# Patient Record
Sex: MALE | Race: BLACK OR AFRICAN AMERICAN | Employment: FULL TIME | URBAN - METROPOLITAN AREA
[De-identification: names, ages, dates, MRNs, and addresses within clinical notes are randomized per-mention and may not be internally consistent; named-entity substitution may affect disease eponyms.]

---

## 2021-03-17 ENCOUNTER — OFFICE VISIT (OUTPATIENT)
Dept: FAMILY MEDICINE CLINIC | Age: 36
End: 2021-03-17
Payer: COMMERCIAL

## 2021-03-17 VITALS
WEIGHT: 145.2 LBS | HEIGHT: 67 IN | DIASTOLIC BLOOD PRESSURE: 80 MMHG | OXYGEN SATURATION: 98 % | BODY MASS INDEX: 22.79 KG/M2 | HEART RATE: 85 BPM | SYSTOLIC BLOOD PRESSURE: 132 MMHG | TEMPERATURE: 98.2 F

## 2021-03-17 DIAGNOSIS — F41.9 ANXIETY: Primary | ICD-10-CM

## 2021-03-17 DIAGNOSIS — Z13.1 SCREENING FOR DIABETES MELLITUS: ICD-10-CM

## 2021-03-17 DIAGNOSIS — J30.2 SEASONAL ALLERGIES: ICD-10-CM

## 2021-03-17 DIAGNOSIS — F51.01 PRIMARY INSOMNIA: ICD-10-CM

## 2021-03-17 DIAGNOSIS — K21.9 GASTROESOPHAGEAL REFLUX DISEASE WITHOUT ESOPHAGITIS: ICD-10-CM

## 2021-03-17 DIAGNOSIS — Z13.220 SCREENING FOR LIPID DISORDERS: ICD-10-CM

## 2021-03-17 DIAGNOSIS — G89.29 CHRONIC BILATERAL LOW BACK PAIN WITHOUT SCIATICA: ICD-10-CM

## 2021-03-17 DIAGNOSIS — M54.50 CHRONIC BILATERAL LOW BACK PAIN WITHOUT SCIATICA: ICD-10-CM

## 2021-03-17 DIAGNOSIS — D22.9 ATYPICAL NEVI: ICD-10-CM

## 2021-03-17 DIAGNOSIS — H35.713 CENTRAL SEROUS CHORIORETINOPATHY OF BOTH EYES: ICD-10-CM

## 2021-03-17 DIAGNOSIS — M21.921: ICD-10-CM

## 2021-03-17 LAB
A/G RATIO: 1.5 (ref 1.1–2.2)
ALBUMIN SERPL-MCNC: 4.9 G/DL (ref 3.4–5)
ALP BLD-CCNC: 59 U/L (ref 40–129)
ALT SERPL-CCNC: 16 U/L (ref 10–40)
ANION GAP SERPL CALCULATED.3IONS-SCNC: 14 MMOL/L (ref 3–16)
AST SERPL-CCNC: 19 U/L (ref 15–37)
BILIRUB SERPL-MCNC: 1.5 MG/DL (ref 0–1)
BUN BLDV-MCNC: 13 MG/DL (ref 7–20)
CALCIUM SERPL-MCNC: 9.7 MG/DL (ref 8.3–10.6)
CHLORIDE BLD-SCNC: 106 MMOL/L (ref 99–110)
CHOLESTEROL, TOTAL: 205 MG/DL (ref 0–199)
CO2: 26 MMOL/L (ref 21–32)
CREAT SERPL-MCNC: 0.9 MG/DL (ref 0.9–1.3)
GFR AFRICAN AMERICAN: >60
GFR NON-AFRICAN AMERICAN: >60
GLOBULIN: 3.2 G/DL
GLUCOSE BLD-MCNC: 96 MG/DL (ref 70–99)
HDLC SERPL-MCNC: 64 MG/DL (ref 40–60)
LDL CHOLESTEROL CALCULATED: 109 MG/DL
POTASSIUM SERPL-SCNC: 4.5 MMOL/L (ref 3.5–5.1)
SODIUM BLD-SCNC: 146 MMOL/L (ref 136–145)
TOTAL PROTEIN: 8.1 G/DL (ref 6.4–8.2)
TRIGL SERPL-MCNC: 162 MG/DL (ref 0–150)
VLDLC SERPL CALC-MCNC: 32 MG/DL

## 2021-03-17 PROCEDURE — 99204 OFFICE O/P NEW MOD 45 MIN: CPT | Performed by: NURSE PRACTITIONER

## 2021-03-17 ASSESSMENT — ENCOUNTER SYMPTOMS
BACK PAIN: 0
COUGH: 0
NAUSEA: 0
SHORTNESS OF BREATH: 0
BLOOD IN STOOL: 0
ABDOMINAL PAIN: 0
CONSTIPATION: 0
VOMITING: 0

## 2021-03-17 ASSESSMENT — ANXIETY QUESTIONNAIRES
3. WORRYING TOO MUCH ABOUT DIFFERENT THINGS: 3-NEARLY EVERY DAY
7. FEELING AFRAID AS IF SOMETHING AWFUL MIGHT HAPPEN: 2-OVER HALF THE DAYS
2. NOT BEING ABLE TO STOP OR CONTROL WORRYING: 2-OVER HALF THE DAYS
1. FEELING NERVOUS, ANXIOUS, OR ON EDGE: 3-NEARLY EVERY DAY

## 2021-03-17 ASSESSMENT — PATIENT HEALTH QUESTIONNAIRE - PHQ9
10. IF YOU CHECKED OFF ANY PROBLEMS, HOW DIFFICULT HAVE THESE PROBLEMS MADE IT FOR YOU TO DO YOUR WORK, TAKE CARE OF THINGS AT HOME, OR GET ALONG WITH OTHER PEOPLE: 1
9. THOUGHTS THAT YOU WOULD BE BETTER OFF DEAD, OR OF HURTING YOURSELF: 0
4. FEELING TIRED OR HAVING LITTLE ENERGY: 2
7. TROUBLE CONCENTRATING ON THINGS, SUCH AS READING THE NEWSPAPER OR WATCHING TELEVISION: 0
SUM OF ALL RESPONSES TO PHQ QUESTIONS 1-9: 8
SUM OF ALL RESPONSES TO PHQ QUESTIONS 1-9: 8
8. MOVING OR SPEAKING SO SLOWLY THAT OTHER PEOPLE COULD HAVE NOTICED. OR THE OPPOSITE, BEING SO FIGETY OR RESTLESS THAT YOU HAVE BEEN MOVING AROUND A LOT MORE THAN USUAL: 0
3. TROUBLE FALLING OR STAYING ASLEEP: 2

## 2021-03-17 NOTE — PROGRESS NOTES
3/17/2021    Clary Forde (:  1985) is a 38 y.o. male, here for evaluation of the following medical concerns:         Establish Care       Concern for HTN- was elevated when he got his wisdom teeth removed. Concern for DM2- no reason. Eye Problem (. 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 Is following with ophthalmology. Gastroesophageal Reflux- was on Prilosec, quit taking it and is not bothering him. Anxiety (never been on meds, wants to see a therapist)   Stressed about his health. Insomnia (no able to sleep at night ) goes to sleep, but wakes up and cannot get back to sleep. Is wide awake, but denies being anxious or racing thoughts. Not sure what he wants to do about his anxiety and insomnia. Thinks its situational -  Is able to control anxiety, is immediately panicky, but then can calm himself down. Denies SI      Would like to try behavioral therapy     Back Pain (couple years  ago had sciatica) was working at [employer] and was lifting something and immediately had sciatica. Went to urgent care. None since. Back stiffness when waking up in the AM, resolved with movement. Bony structure (right elbow, no pain, sometimes there is discomfort. ) started about one month ago. Has never had this before. No limited ROM numbness tingling injury. Mole (inner left leg thigh)- has been there about five years. Not rapidly growing. Itches sometimes. Does not open up or bleed. Has always been black in color. Is rough. No skin cancer in [facility]. Not a UV ray area on his body. Other (talk about COVID- Vaccines)                  Review of Systems   Constitutional: Negative for activity change, appetite change, chills, diaphoresis, fatigue, fever and unexpected weight change. Eyes: Negative for visual disturbance. Respiratory: Negative for cough and shortness of breath.     Cardiovascular: Negative for chest pain, palpitations and leg swelling. Gastrointestinal: Negative for abdominal pain, blood in stool, constipation, nausea and vomiting. Genitourinary: Negative for difficulty urinating. Musculoskeletal: Negative for arthralgias, back pain and gait problem. Skin: Negative. Neurological: Negative for dizziness, weakness, light-headedness, numbness and headaches. Psychiatric/Behavioral: Positive for sleep disturbance. Negative for dysphoric mood and suicidal ideas. The patient is nervous/anxious. Prior to Visit Medications    Not on File        No Known Allergies    History reviewed. No pertinent past medical history. Past Surgical History:   Procedure Laterality Date    TOOTH EXTRACTION         Social History     Socioeconomic History    Marital status: Single     Spouse name: Not on file    Number of children: Not on file    Years of education: Not on file    Highest education level: Not on file   Occupational History    Not on file   Social Needs    Financial resource strain: Not on file    Food insecurity     Worry: Not on file     Inability: Not on file    Transportation needs     Medical: Not on file     Non-medical: Not on file   Tobacco Use    Smoking status: Never Smoker    Smokeless tobacco: Never Used   Substance and Sexual Activity    Alcohol use: Yes     Comment: 3-4 times per week- 1-2 glasses of wine.     Drug use: Never    Sexual activity: Yes   Lifestyle    Physical activity     Days per week: Not on file     Minutes per session: Not on file    Stress: Not on file   Relationships    Social connections     Talks on phone: Not on file     Gets together: Not on file     Attends [employer] service: Not on file     Active member of club or organization: Not on file     Attends meetings of clubs or organizations: Not on file     Relationship status: Not on file    Intimate partner violence     Fear of current or ex partner: Not on file     Emotionally abused: Not on file     Physically abused: Not on file     Forced sexual activity: Not on file   Other Topics Concern    Not on file   Social History Narrative    Not on file        History reviewed. No pertinent family history. Vitals:    03/17/21 0830   BP: 132/80   Site: Left Upper Arm   Position: Sitting   Cuff Size: Medium Adult   Pulse: 85   Temp: 98.2 °F (36.8 °C)   SpO2: 98%   Weight: 145 lb 3.2 oz (65.9 kg)   Height: 5' 7\" (1.702 m)     Estimated body mass index is 22.74 kg/m² as calculated from the following:    Height as of this encounter: 5' 7\" (1.702 m). Weight as of this encounter: 145 lb 3.2 oz (65.9 kg). Physical Exam  Vitals signs reviewed. Constitutional:       General: He is not in acute distress. Appearance: Normal appearance. He is normal weight. He is not ill-appearing, toxic-appearing or diaphoretic. HENT:      Head: Normocephalic and atraumatic. Nose: Nose normal.   Eyes:      Extraocular Movements: Extraocular movements intact. Pupils: Pupils are equal, round, and reactive to light. Neck:      Musculoskeletal: Normal range of motion and neck supple. Cardiovascular:      Rate and Rhythm: Normal rate and regular rhythm. Heart sounds: Normal heart sounds. Pulmonary:      Effort: Pulmonary effort is normal. No respiratory distress. Breath sounds: Normal breath sounds. Abdominal:      General: Bowel sounds are normal. There is no distension. Palpations: Abdomen is soft. There is no mass. Tenderness: There is no abdominal tenderness. Hernia: No hernia is present. Musculoskeletal: Normal range of motion. Comments: Right elbow hard, non tender bony protrusion. No crepitus or edema. Normal ROM. Not reducible    Skin:     General: Skin is warm and dry. Comments: Black rough irregular bordered lesion left medial thigh   Neurological:      General: No focal deficit present. Mental Status: He is alert and oriented to person, place, and time. Mental status is at baseline. Motor: No weakness. Gait: Gait normal.   Psychiatric:         Mood and Affect: Mood normal.         Behavior: Behavior normal.         Thought Content: Thought content normal.         Judgment: Judgment normal.         ASSESSMENT/PLAN:  1. Anxiety  Call provider with in network psych referral name      2. Primary insomnia  Melatonin PRN OTC. No naps. No caffeine after 3pm  Discussed sleep hygeine    3. Gastroesophageal reflux disease without esophagitis  Discussed long term PPI use risk and changing diet     4. Seasonal allergies  OTC zyrtec or flonase     5. Chronic bilateral low back pain without sciatica  Exercise. Core strengthening. Chiropractics     6. Atypical nevi  - [facility] - Timothy Benz DO, Dermatology, [place]    7. Deformity, elbow acquired, right  Follow up if wanting XRAY. Not currently bothering him. 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 Screening for lipid disorders  - Lipid Panel; Future    10. Screening for diabetes mellitus  - Comprehensive Metabolic Panel; Future    Fasting labs per request   Had HIV and hepatitis screenings in 2018- negative. Has not been sexually active since being tested. Refuses flu vaccine       Present to the ER for any emergent or acute symptoms not managed at home or in office. .  Return in about 1 year (around 3/17/2022) for anual exam.    An  electronic signature was used to authenticate this note.     --RAYNE Gupta - NP on 3/17/2021 at 12:47 PM

## 2021-03-19 ENCOUNTER — TELEPHONE (OUTPATIENT)
Dept: FAMILY MEDICINE CLINIC | Age: 36
End: 2021-03-19

## 2022-01-06 RX ORDER — CETIRIZINE HYDROCHLORIDE 10 MG/1
10 TABLET ORAL DAILY
Qty: 90 TABLET | Refills: 0 | Status: SHIPPED | OUTPATIENT
Start: 2022-01-06 | End: 2022-03-16 | Stop reason: SDUPTHER

## 2022-01-06 NOTE — TELEPHONE ENCOUNTER
Patient is requesting a refill for Zyrtec 10mg. States that he was getting this prescribed. Please advise.

## 2022-01-06 NOTE — TELEPHONE ENCOUNTER
Patient has never been prescribed zyrtec for his allergies, but his last note march 2021 does mention OTC zyrtec use. Will fill 90 day supply.  Insurance may not cover due to being OTC med

## 2022-03-16 ENCOUNTER — OFFICE VISIT (OUTPATIENT)
Dept: FAMILY MEDICINE CLINIC | Age: 37
End: 2022-03-16
Payer: COMMERCIAL

## 2022-03-16 VITALS
DIASTOLIC BLOOD PRESSURE: 84 MMHG | HEART RATE: 87 BPM | SYSTOLIC BLOOD PRESSURE: 136 MMHG | WEIGHT: 154.6 LBS | BODY MASS INDEX: 24.27 KG/M2 | HEIGHT: 67 IN | OXYGEN SATURATION: 99 %

## 2022-03-16 DIAGNOSIS — K21.9 GASTROESOPHAGEAL REFLUX DISEASE WITHOUT ESOPHAGITIS: ICD-10-CM

## 2022-03-16 DIAGNOSIS — F41.9 ANXIETY: ICD-10-CM

## 2022-03-16 DIAGNOSIS — J30.2 SEASONAL ALLERGIES: ICD-10-CM

## 2022-03-16 DIAGNOSIS — Z00.00 ANNUAL PHYSICAL EXAM: Primary | ICD-10-CM

## 2022-03-16 PROCEDURE — 99395 PREV VISIT EST AGE 18-39: CPT | Performed by: NURSE PRACTITIONER

## 2022-03-16 RX ORDER — CETIRIZINE HYDROCHLORIDE 10 MG/1
10 TABLET ORAL DAILY
Qty: 90 TABLET | Refills: 3 | Status: SHIPPED | OUTPATIENT
Start: 2022-03-16

## 2022-03-16 ASSESSMENT — PATIENT HEALTH QUESTIONNAIRE - PHQ9
2. FEELING DOWN, DEPRESSED OR HOPELESS: 0
SUM OF ALL RESPONSES TO PHQ QUESTIONS 1-9: 0
1. LITTLE INTEREST OR PLEASURE IN DOING THINGS: 0
SUM OF ALL RESPONSES TO PHQ QUESTIONS 1-9: 0
SUM OF ALL RESPONSES TO PHQ QUESTIONS 1-9: 0
SUM OF ALL RESPONSES TO PHQ9 QUESTIONS 1 & 2: 0
SUM OF ALL RESPONSES TO PHQ QUESTIONS 1-9: 0

## 2022-03-16 NOTE — PROGRESS NOTES
3/16/2022     Toy Labrum (:  1985) is a 43 y.o. male, here for evaluation of the following medical concerns:        Annual exam.     Seasonal allergies - taking zyrtec and symptoms are controlled. Moods are good. Went to therapy. Reports it was a terrible experience and he has not returned. Reports the therapist told him that he didn't need him and to exercise instead. Denies suicidal thought plan idea. tinnitus in left ear after wisdom tooth removal last year . Denies concerns or complaints today             Review of Systems    Prior to Visit Medications    Medication Sig Taking? Authorizing Provider   cetirizine (ZYRTEC) 10 MG tablet Take 1 tablet by mouth daily Yes RAYNE Ford NP        Social History     Tobacco Use    Smoking status: Never Smoker    Smokeless tobacco: Never Used   Substance Use Topics    Alcohol use: Yes     Comment: 3-4 times per week- 1-2 glasses of wine. Vitals:    22 0805   BP: 136/84   Site: Left Upper Arm   Position: Sitting   Pulse: 87   SpO2: 99%   Weight: 154 lb 9.6 oz (70.1 kg)   Height: 5' 7\" (1.702 m)     Estimated body mass index is 24.21 kg/m² as calculated from the following:    Height as of this encounter: 5' 7\" (1.702 m). Weight as of this encounter: 154 lb 9.6 oz (70.1 kg). Physical Exam  Vitals reviewed. Constitutional:       General: He is not in acute distress. Appearance: Normal appearance. He is not ill-appearing, toxic-appearing or diaphoretic. HENT:      Head: Normocephalic and atraumatic. Nose: Nose normal.   Eyes:      Extraocular Movements: Extraocular movements intact. Pupils: Pupils are equal, round, and reactive to light. Cardiovascular:      Rate and Rhythm: Normal rate and regular rhythm. Heart sounds: Normal heart sounds. Pulmonary:      Effort: Pulmonary effort is normal. No respiratory distress. Breath sounds: Normal breath sounds.    Abdominal: General: Bowel sounds are normal. There is no distension. Palpations: Abdomen is soft. There is no mass. Tenderness: There is no abdominal tenderness. Hernia: No hernia is present. Musculoskeletal:         General: Normal range of motion. Cervical back: Normal range of motion and neck supple. Skin:     General: Skin is warm and dry. Neurological:      General: No focal deficit present. Mental Status: He is alert and oriented to person, place, and time. Mental status is at baseline. Motor: No weakness. Gait: Gait normal.   Psychiatric:         Mood and Affect: Mood normal.         Behavior: Behavior normal.         Thought Content: Thought content normal.         Judgment: Judgment normal.         ASSESSMENT/PLAN:  1. Annual physical exam    2. Anxiety    3. Seasonal allergies    4. Gastroesophageal reflux disease without esophagitis        Pepcid and Tums as needed for indigestion. Diet changes have been made. Declines medication or therapy needed for anxiety  Seasonal allergies controlled with Zyrtec      Follow-up in 1 year for annual exam          All care gaps addressed     All questions answered    Discussed use, benefit, and side effects of prescribed medications. Barriers to compliance discussed. All patient questions answered. Pt voiced understanding. Present to the ER for any emergent or acute symptoms not managed at home or in office. Please note that this chart was generated using dragon dictation software. Although every effort was made to ensure the accuracy of this automated transcription, some errors in transcription may have occurred. Return in about 1 year (around 3/16/2023) for anual exam.    An electronic signature was used to authenticate this note.     --Mirella Fernando, RAYNE - NP on 3/16/2022 at 9:03 AM

## 2023-03-17 ENCOUNTER — OFFICE VISIT (OUTPATIENT)
Dept: FAMILY MEDICINE CLINIC | Age: 38
End: 2023-03-17
Payer: COMMERCIAL

## 2023-03-17 VITALS
HEIGHT: 67 IN | WEIGHT: 155 LBS | HEART RATE: 87 BPM | BODY MASS INDEX: 24.33 KG/M2 | SYSTOLIC BLOOD PRESSURE: 130 MMHG | OXYGEN SATURATION: 97 % | DIASTOLIC BLOOD PRESSURE: 80 MMHG

## 2023-03-17 DIAGNOSIS — Z13.220 SCREENING FOR LIPID DISORDERS: ICD-10-CM

## 2023-03-17 DIAGNOSIS — Z13.1 SCREENING FOR DIABETES MELLITUS: ICD-10-CM

## 2023-03-17 DIAGNOSIS — Z00.00 ANNUAL PHYSICAL EXAM: Primary | ICD-10-CM

## 2023-03-17 DIAGNOSIS — F41.9 ANXIETY: ICD-10-CM

## 2023-03-17 DIAGNOSIS — H35.713 CENTRAL SEROUS CHORIORETINOPATHY OF BOTH EYES: ICD-10-CM

## 2023-03-17 DIAGNOSIS — J30.2 SEASONAL ALLERGIES: ICD-10-CM

## 2023-03-17 PROCEDURE — 99395 PREV VISIT EST AGE 18-39: CPT | Performed by: NURSE PRACTITIONER

## 2023-03-17 SDOH — ECONOMIC STABILITY: FOOD INSECURITY: WITHIN THE PAST 12 MONTHS, YOU WORRIED THAT YOUR FOOD WOULD RUN OUT BEFORE YOU GOT MONEY TO BUY MORE.: NEVER TRUE

## 2023-03-17 SDOH — ECONOMIC STABILITY: FOOD INSECURITY: WITHIN THE PAST 12 MONTHS, THE FOOD YOU BOUGHT JUST DIDN'T LAST AND YOU DIDN'T HAVE MONEY TO GET MORE.: NEVER TRUE

## 2023-03-17 SDOH — ECONOMIC STABILITY: HOUSING INSECURITY
IN THE LAST 12 MONTHS, WAS THERE A TIME WHEN YOU DID NOT HAVE A STEADY PLACE TO SLEEP OR SLEPT IN A SHELTER (INCLUDING NOW)?: NO

## 2023-03-17 SDOH — ECONOMIC STABILITY: INCOME INSECURITY: HOW HARD IS IT FOR YOU TO PAY FOR THE VERY BASICS LIKE FOOD, HOUSING, MEDICAL CARE, AND HEATING?: NOT HARD AT ALL

## 2023-03-17 ASSESSMENT — PATIENT HEALTH QUESTIONNAIRE - PHQ9
SUM OF ALL RESPONSES TO PHQ9 QUESTIONS 1 & 2: 0
SUM OF ALL RESPONSES TO PHQ QUESTIONS 1-9: 0
1. LITTLE INTEREST OR PLEASURE IN DOING THINGS: 0
2. FEELING DOWN, DEPRESSED OR HOPELESS: 0

## 2023-03-17 NOTE — PROGRESS NOTES
3/17/2023     Filomena Villarreal (:  1985) is a 42 y.o. male, here for evaluation of the following medical concerns:      Annual exam.      Seasonal allergies - taking zyrtec and symptoms are controlled. Anxiety - Moods are good. Not medicated     Elevation of vein in left AC. No pain. No change in color. Present for years, not changing. Denies IV drug use, excessive labs or blood pressure checks or injury. Central serous chorioretinopathy of both eyes. No vision problems now. Did have floaters. Has had multiple tests done and cleared without tx by eye spec. Review of Systems    Prior to Visit Medications    Medication Sig Taking? Authorizing Provider   cetirizine (ZYRTEC) 10 MG tablet Take 1 tablet by mouth daily Yes RAYNE Sarabia NP        Social History     Tobacco Use    Smoking status: Never    Smokeless tobacco: Never   Substance Use Topics    Alcohol use: Yes     Comment: 3-4 times per week- 1-2 glasses of wine. Vitals:    23 0811   BP: 130/80   Site: Left Upper Arm   Position: Sitting   Pulse: 87   SpO2: 97%   Weight: 155 lb (70.3 kg)   Height: 5' 7\" (1.702 m)     Estimated body mass index is 24.28 kg/m² as calculated from the following:    Height as of this encounter: 5' 7\" (1.702 m). Weight as of this encounter: 155 lb (70.3 kg). Physical Exam  Vitals reviewed. Constitutional:       General: He is not in acute distress. Appearance: Normal appearance. He is not ill-appearing, toxic-appearing or diaphoretic. HENT:      Head: Normocephalic and atraumatic. Nose: Nose normal.   Eyes:      Extraocular Movements: Extraocular movements intact. Pupils: Pupils are equal, round, and reactive to light. Cardiovascular:      Rate and Rhythm: Normal rate and regular rhythm. Heart sounds: Normal heart sounds. Comments: Small elevation of left AC vein. No pain or tenderness. No change in color.   Pulmonary:      Effort: Pulmonary effort is normal. No respiratory distress. Breath sounds: Normal breath sounds. Abdominal:      General: Bowel sounds are normal. There is no distension. Palpations: Abdomen is soft. There is no mass. Tenderness: There is no abdominal tenderness. Hernia: No hernia is present. Musculoskeletal:         General: Normal range of motion. Cervical back: Normal range of motion and neck supple. Right lower leg: No edema. Left lower leg: No edema. Skin:     General: Skin is warm and dry. Neurological:      General: No focal deficit present. Mental Status: He is alert and oriented to person, place, and time. Mental status is at baseline. Motor: No weakness. Gait: Gait normal.   Psychiatric:         Mood and Affect: Mood normal.         Behavior: Behavior normal.         Thought Content: Thought content normal.         Judgment: Judgment normal.       ASSESSMENT/PLAN:  1. Annual physical exam    2. Anxiety    3. Seasonal allergies    4. Central serous chorioretinopathy of both eyes    5. Screening for lipid disorders  - Lipid Panel; Future    6. Screening for diabetes mellitus  - Comprehensive Metabolic Panel; Future      All diag controlled. Fasting labs   Follow up one year,   Tdap at pharmacy     Monitor vein. Call if changing       All care gaps addressed     All questions answered    Discussed use, benefit, and side effects of prescribed medications. Barriers to compliance discussed. All patient questions answered. Pt voiced understanding. Present to the ER for any emergent or acute symptoms not managed at home or in office. Please note that this chart was generated using dragon dictation software. Although every effort was made to ensure the accuracy of this automated transcription, some errors in transcription may have occurred. No follow-ups on file. An electronic signature was used to authenticate this note.     --RAYNE Hernández - NP on 3/17/2023 at 8:52 AM

## 2023-07-14 ENCOUNTER — PATIENT MESSAGE (OUTPATIENT)
Dept: FAMILY MEDICINE CLINIC | Age: 38
End: 2023-07-14

## 2023-07-14 ENCOUNTER — OFFICE VISIT (OUTPATIENT)
Dept: FAMILY MEDICINE CLINIC | Age: 38
End: 2023-07-14
Payer: COMMERCIAL

## 2023-07-14 VITALS
DIASTOLIC BLOOD PRESSURE: 82 MMHG | HEART RATE: 119 BPM | OXYGEN SATURATION: 98 % | SYSTOLIC BLOOD PRESSURE: 134 MMHG | BODY MASS INDEX: 24.28 KG/M2 | WEIGHT: 155 LBS

## 2023-07-14 DIAGNOSIS — J35.8 TONSILLAR CYST: Primary | ICD-10-CM

## 2023-07-14 DIAGNOSIS — K21.9 GASTROESOPHAGEAL REFLUX DISEASE WITHOUT ESOPHAGITIS: ICD-10-CM

## 2023-07-14 DIAGNOSIS — R00.0 TACHYCARDIA: ICD-10-CM

## 2023-07-14 PROCEDURE — 99214 OFFICE O/P EST MOD 30 MIN: CPT | Performed by: NURSE PRACTITIONER

## 2023-07-14 RX ORDER — OMEPRAZOLE 20 MG/1
20 CAPSULE, DELAYED RELEASE ORAL DAILY PRN
COMMUNITY

## 2023-07-14 NOTE — PATIENT INSTRUCTIONS
[place] [id number] [employer]  [phone]      Monitor HR at home. If still >100 ---- go get labs done and EKG.

## 2023-07-17 NOTE — TELEPHONE ENCOUNTER
From: Shannon Schmid  To: Lalita Guerra  Sent: 7/14/2023 4:29 PM EDT  Subject: cyst    Veleria Post,     After looking at workup for the tonsil cyst, I do not think we need an antibiotic. If you do get a sore neck and lymph nodes, redness around the cyst or it breaks open, becomes very painful OR any other changes please let me know. Lets get you into ENT and see if they want to leave it be and resolve or remove it.        Noni PATRICK

## 2024-02-14 RX ORDER — CETIRIZINE HYDROCHLORIDE 10 MG/1
TABLET ORAL
Qty: 90 TABLET | Refills: 2 | Status: SHIPPED | OUTPATIENT
Start: 2024-02-14

## 2024-02-14 NOTE — TELEPHONE ENCOUNTER
Last appointment: 7/14/2023   Next Appointment: 3/18/2024     Allergies:  No Known Allergies      Recent Vitals:  Wt Readings from Last 3 Encounters:   07/14/23 70.3 kg (155 lb)   03/17/23 70.3 kg (155 lb)   03/16/22 70.1 kg (154 lb 9.6 oz)     Ht Readings from Last 3 Encounters:   03/17/23 1.702 m (5' 7\")   03/16/22 1.702 m (5' 7\")   03/17/21 1.702 m (5' 7\")     BP Readings from Last 3 Encounters:   07/14/23 134/82   03/17/23 130/80   03/16/22 136/84     BMI Readings from Last 3 Encounters:   07/14/23 24.28 kg/m²   03/17/23 24.28 kg/m²   03/16/22 24.21 kg/m²       Recent Labs__________________________________________________________________________    Renal:   Creatinine   Date Value Ref Range Status   03/17/2021 0.9 0.9 - 1.3 mg/dL Final     BUN   Date Value Ref Range Status   03/17/2021 13 7 - 20 mg/dL Final     Sodium   Date Value Ref Range Status   03/17/2021 146 (H) 136 - 145 mmol/L Final     Potassium   Date Value Ref Range Status   03/17/2021 4.5 3.5 - 5.1 mmol/L Final     Chloride   Date Value Ref Range Status   03/17/2021 106 99 - 110 mmol/L Final     CO2   Date Value Ref Range Status   03/17/2021 26 21 - 32 mmol/L Final       BMP:    Sodium   Date Value Ref Range Status   03/17/2021 146 (H) 136 - 145 mmol/L Final     Potassium   Date Value Ref Range Status   03/17/2021 4.5 3.5 - 5.1 mmol/L Final     Chloride   Date Value Ref Range Status   03/17/2021 106 99 - 110 mmol/L Final     CO2   Date Value Ref Range Status   03/17/2021 26 21 - 32 mmol/L Final     BUN   Date Value Ref Range Status   03/17/2021 13 7 - 20 mg/dL Final     Creatinine   Date Value Ref Range Status   03/17/2021 0.9 0.9 - 1.3 mg/dL Final     Glucose   Date Value Ref Range Status   03/17/2021 96 70 - 99 mg/dL Final     Calcium   Date Value Ref Range Status   03/17/2021 9.7 8.3 - 10.6 mg/dL Final     GFR Non-   Date Value Ref Range Status   03/17/2021 >60 >60 Final     Comment:     >60 mL/min/1.73m2 EGFR, calc. for ages 18

## 2024-03-18 ENCOUNTER — OFFICE VISIT (OUTPATIENT)
Dept: FAMILY MEDICINE CLINIC | Age: 39
End: 2024-03-18
Payer: COMMERCIAL

## 2024-03-18 VITALS
WEIGHT: 160.6 LBS | HEART RATE: 98 BPM | BODY MASS INDEX: 25.21 KG/M2 | SYSTOLIC BLOOD PRESSURE: 160 MMHG | DIASTOLIC BLOOD PRESSURE: 90 MMHG | OXYGEN SATURATION: 94 % | HEIGHT: 67 IN

## 2024-03-18 DIAGNOSIS — K21.9 GASTROESOPHAGEAL REFLUX DISEASE WITHOUT ESOPHAGITIS: ICD-10-CM

## 2024-03-18 DIAGNOSIS — Z00.00 ANNUAL PHYSICAL EXAM: Primary | ICD-10-CM

## 2024-03-18 DIAGNOSIS — Z13.1 SCREENING FOR DIABETES MELLITUS: ICD-10-CM

## 2024-03-18 DIAGNOSIS — J30.2 SEASONAL ALLERGIES: ICD-10-CM

## 2024-03-18 DIAGNOSIS — F41.9 ANXIETY: ICD-10-CM

## 2024-03-18 DIAGNOSIS — Z13.220 SCREENING FOR LIPID DISORDERS: ICD-10-CM

## 2024-03-18 PROCEDURE — 99396 PREV VISIT EST AGE 40-64: CPT | Performed by: NURSE PRACTITIONER

## 2024-03-18 RX ORDER — CETIRIZINE HYDROCHLORIDE 10 MG/1
10 TABLET ORAL DAILY
Qty: 90 TABLET | Refills: 2 | Status: CANCELLED | OUTPATIENT
Start: 2024-03-18

## 2024-03-18 SDOH — ECONOMIC STABILITY: FOOD INSECURITY: WITHIN THE PAST 12 MONTHS, YOU WORRIED THAT YOUR FOOD WOULD RUN OUT BEFORE YOU GOT MONEY TO BUY MORE.: NEVER TRUE

## 2024-03-18 SDOH — ECONOMIC STABILITY: FOOD INSECURITY: WITHIN THE PAST 12 MONTHS, THE FOOD YOU BOUGHT JUST DIDN'T LAST AND YOU DIDN'T HAVE MONEY TO GET MORE.: NEVER TRUE

## 2024-03-18 SDOH — ECONOMIC STABILITY: INCOME INSECURITY: HOW HARD IS IT FOR YOU TO PAY FOR THE VERY BASICS LIKE FOOD, HOUSING, MEDICAL CARE, AND HEATING?: NOT HARD AT ALL

## 2024-03-18 ASSESSMENT — PATIENT HEALTH QUESTIONNAIRE - PHQ9
SUM OF ALL RESPONSES TO PHQ QUESTIONS 1-9: 0
SUM OF ALL RESPONSES TO PHQ9 QUESTIONS 1 & 2: 0
2. FEELING DOWN, DEPRESSED OR HOPELESS: NOT AT ALL
SUM OF ALL RESPONSES TO PHQ QUESTIONS 1-9: 0
1. LITTLE INTEREST OR PLEASURE IN DOING THINGS: NOT AT ALL

## 2024-03-18 NOTE — PROGRESS NOTES
3/18/2024     Miguel Jack (:  1985) is a 38 y.o. male, here for evaluation of the following medical concerns:          Annual exam.      Seasonal allergies    taking zyrtec and symptoms are controlled.         Anxiety    Moods are good. Not medicated    anxiety situational   Denies suicidal self harm thought plan idea      Intermittent tachycardia and high blood pressure at the office. Labs not done from 2024. States he feels fine. He monitors at home.       GERD   Controlled with prilosec every other day.       Denies concerns today             Review of Systems    Prior to Visit Medications    Medication Sig Taking? Authorizing Provider   cetirizine (ZYRTEC) 10 MG tablet TAKE 1 TABLET BY MOUTH EVERY DAY Yes Noni Argueta, APRN - NP   omeprazole (PRILOSEC) 20 MG delayed release capsule Take 1 capsule by mouth daily as needed Yes Provider, Vivian, MD        Social History     Tobacco Use    Smoking status: Never    Smokeless tobacco: Never   Substance Use Topics    Alcohol use: Yes     Comment: 3-4 times per week- 1-2 glasses of wine.        Vitals:    24 0806 24 0811   BP: (!) 162/100 (!) 160/90   Pulse: 98    SpO2: 94%    Weight: 72.8 kg (160 lb 9.6 oz)    Height: 1.702 m (5' 7\")          Estimated body mass index is 25.15 kg/m² as calculated from the following:    Height as of this encounter: 1.702 m (5' 7\").    Weight as of this encounter: 72.8 kg (160 lb 9.6 oz).          Physical Exam  Constitutional:       General: He is not in acute distress.     Appearance: Normal appearance. He is not ill-appearing, toxic-appearing or diaphoretic.   Eyes:      Extraocular Movements: Extraocular movements intact.      Pupils: Pupils are equal, round, and reactive to light.   Cardiovascular:      Rate and Rhythm: Normal rate and regular rhythm.      Heart sounds: Normal heart sounds.   Pulmonary:      Effort: Pulmonary effort is normal.      Breath sounds: Normal breath sounds. No